# Patient Record
Sex: MALE | Race: WHITE | NOT HISPANIC OR LATINO | Employment: OTHER | URBAN - METROPOLITAN AREA
[De-identification: names, ages, dates, MRNs, and addresses within clinical notes are randomized per-mention and may not be internally consistent; named-entity substitution may affect disease eponyms.]

---

## 2017-03-08 ENCOUNTER — GENERIC CONVERSION - ENCOUNTER (OUTPATIENT)
Dept: OTHER | Facility: OTHER | Age: 67
End: 2017-03-08

## 2017-05-09 ENCOUNTER — GENERIC CONVERSION - ENCOUNTER (OUTPATIENT)
Dept: OTHER | Facility: OTHER | Age: 67
End: 2017-05-09

## 2017-05-11 ENCOUNTER — GENERIC CONVERSION - ENCOUNTER (OUTPATIENT)
Dept: OTHER | Facility: OTHER | Age: 67
End: 2017-05-11

## 2017-05-23 ENCOUNTER — GENERIC CONVERSION - ENCOUNTER (OUTPATIENT)
Dept: OTHER | Facility: OTHER | Age: 67
End: 2017-05-23

## 2017-05-26 ENCOUNTER — ALLSCRIPTS OFFICE VISIT (OUTPATIENT)
Dept: OTHER | Facility: OTHER | Age: 67
End: 2017-05-26

## 2017-05-26 ENCOUNTER — HOSPITAL ENCOUNTER (OUTPATIENT)
Dept: RADIOLOGY | Age: 67
Discharge: HOME | End: 2017-05-26
Payer: COMMERCIAL

## 2017-05-26 ENCOUNTER — TRANSCRIBE ORDERS (OUTPATIENT)
Dept: ADMINISTRATIVE | Age: 67
End: 2017-05-26

## 2017-05-26 DIAGNOSIS — I10 ESSENTIAL (PRIMARY) HYPERTENSION: ICD-10-CM

## 2017-05-26 DIAGNOSIS — R53.83 OTHER FATIGUE: ICD-10-CM

## 2017-05-26 DIAGNOSIS — M94.0 CHONDROCOSTAL JUNCTION SYNDROME: ICD-10-CM

## 2017-05-26 DIAGNOSIS — R73.03 PREDIABETES: ICD-10-CM

## 2017-05-26 DIAGNOSIS — Z12.5 ENCOUNTER FOR SCREENING FOR MALIGNANT NEOPLASM OF PROSTATE: ICD-10-CM

## 2017-05-26 PROCEDURE — 71120 X-RAY EXAM BREASTBONE 2/>VWS: CPT

## 2017-05-26 PROCEDURE — 71111 X-RAY EXAM RIBS/CHEST4/> VWS: CPT

## 2017-05-29 ENCOUNTER — GENERIC CONVERSION - ENCOUNTER (OUTPATIENT)
Dept: OTHER | Facility: OTHER | Age: 67
End: 2017-05-29

## 2017-05-30 ENCOUNTER — GENERIC CONVERSION - ENCOUNTER (OUTPATIENT)
Dept: OTHER | Facility: OTHER | Age: 67
End: 2017-05-30

## 2017-05-30 ENCOUNTER — LAB CONVERSION - ENCOUNTER (OUTPATIENT)
Dept: OTHER | Facility: OTHER | Age: 67
End: 2017-05-30

## 2017-05-30 LAB
25(OH)D3 SERPL-MCNC: 28 NG/ML (ref 30–100)
DEPRECATED RDW RBC AUTO: 14.1 % (ref 11–15)
HBA1C MFR BLD HPLC: 5.8 % OF TOTAL HGB
HCT VFR BLD AUTO: 49.1 % (ref 38.5–50)
HGB BLD-MCNC: 16.1 G/DL (ref 13.2–17.1)
MCH RBC QN AUTO: 29.5 PG (ref 27–33)
MCHC RBC AUTO-ENTMCNC: 32.9 G/DL (ref 32–36)
MCV RBC AUTO: 89.9 FL (ref 80–100)
PLATELET # BLD AUTO: 284 THOUSAND/UL (ref 140–400)
PMV BLD AUTO: 8.1 FL (ref 7.5–12.5)
PROSTATE SPECIFIC ANTIGEN TOTAL (HISTORICAL): 1.9 NG/ML
RBC # BLD AUTO: 5.46 MILLION/UL (ref 4.2–5.8)
VIT B12 SERPL-MCNC: 504 PG/ML (ref 200–1100)
WBC # BLD AUTO: 12 THOUSAND/UL (ref 3.8–10.8)

## 2017-05-31 ENCOUNTER — GENERIC CONVERSION - ENCOUNTER (OUTPATIENT)
Dept: OTHER | Facility: OTHER | Age: 67
End: 2017-05-31

## 2017-06-02 ENCOUNTER — GENERIC CONVERSION - ENCOUNTER (OUTPATIENT)
Dept: OTHER | Facility: OTHER | Age: 67
End: 2017-06-02

## 2017-06-02 LAB
BASOPHILS # BLD AUTO: 0.6 %
BASOPHILS # BLD AUTO: 70 CELLS/UL (ref 0–200)
DEPRECATED RDW RBC AUTO: 14 % (ref 11–15)
EOSINOPHIL # BLD AUTO: 3 %
EOSINOPHIL # BLD AUTO: 348 CELLS/UL (ref 15–500)
HCT VFR BLD AUTO: 46.2 % (ref 38.5–50)
HGB BLD-MCNC: 15.8 G/DL (ref 13.2–17.1)
LYMPHOCYTES # BLD AUTO: 25.5 %
LYMPHOCYTES # BLD AUTO: 2958 CELLS/UL (ref 850–3900)
MCH RBC QN AUTO: 30.9 PG (ref 27–33)
MCHC RBC AUTO-ENTMCNC: 34.3 G/DL (ref 32–36)
MCV RBC AUTO: 90.3 FL (ref 80–100)
MONOCYTES # BLD AUTO: 882 CELLS/UL (ref 200–950)
MONOCYTES (HISTORICAL): 7.6 %
NEUTROPHILS # BLD AUTO: 63.3 %
NEUTROPHILS # BLD AUTO: 7343 CELLS/UL (ref 1500–7800)
PLATELET # BLD AUTO: 275 THOUSAND/UL (ref 140–400)
PMV BLD AUTO: 8.6 FL (ref 7.5–12.5)
RBC # BLD AUTO: 5.12 MILLION/UL (ref 4.2–5.8)
WBC # BLD AUTO: 11.6 THOUSAND/UL (ref 3.8–10.8)

## 2017-08-30 ENCOUNTER — GENERIC CONVERSION - ENCOUNTER (OUTPATIENT)
Dept: OTHER | Facility: OTHER | Age: 67
End: 2017-08-30

## 2017-10-05 ENCOUNTER — GENERIC CONVERSION - ENCOUNTER (OUTPATIENT)
Dept: OTHER | Facility: OTHER | Age: 67
End: 2017-10-05

## 2017-11-30 ENCOUNTER — GENERIC CONVERSION - ENCOUNTER (OUTPATIENT)
Dept: OTHER | Facility: OTHER | Age: 67
End: 2017-11-30

## 2017-12-08 ENCOUNTER — GENERIC CONVERSION - ENCOUNTER (OUTPATIENT)
Dept: INTERNAL MEDICINE CLINIC | Facility: CLINIC | Age: 67
End: 2017-12-08

## 2018-01-10 NOTE — RESULT NOTES
Message   Notify the patient x-ray of the sternum unremarkable it does show diffuse arthritic changes throughout the thoracic spine please have the patient see pain management Dr Debra Cobian follow up as scheduled        Verified Results  * XR RIBS BILATERAL 4+ VIEW W PA CHEST [id number] 11:02AM [facility] Order Number: [id number]     Test Name Result Flag Reference   XR RIBS BILATERAL 4+ VW W PA CHEST (Report)     BILATERAL RIBS AND CHEST - DUAL ENERGY     INDICATION: Chest pain  COMPARISON: None     VIEWS: PA chest (including soft tissue/bone algorithms) and 4 coned down views of the ribs     IMAGES: 10     FINDINGS:     The cardiomediastinal silhouette is unremarkable  The lungs are clear  No pleural effusions  There is no pneumothorax  No rib fractures are identified  IMPRESSION:     1  No active pulmonary disease  2  No evidence of rib fractures  Workstation performed: [id number]     Signed by:   Millie Thompson MD   5/28/17     XR STERNUM MINIMUM 2 VIEWS [id number] 11:02AM [facility] Order Number: [id number]     Test Name Result Flag Reference   XR STERNUM MINIMUM 2 VIEWS (Report)     STERNUM     INDICATION: Sternal pain  COMPARISON: None     VIEWS: 2     IMAGES: 2      FINDINGS:     There is no acute fracture or pathologic bone lesion  The retrosternal clear space is maintained  Incidental note is made of diffuse degenerative changes throughout the thoracic spine  IMPRESSION:     Unremarkable examination of the sternum         Workstation performed: [id number]     Signed by:   Millie Thompson MD   5/28/17       Signatures   Electronically signed by : Roxy Martinez DO; May 29 2017 12:44PM EST                       (Author)

## 2018-01-12 NOTE — RESULT NOTES
Message   notify the patient low vitamin D level, please let me know how much vitamin D the patient is currently taking        Verified Results  *(Q) VITAMIN D, 25-HYDROXY, LC/MS/MS [id number] 10:23AM [facility]i Najera     Test Name Result Flag Reference   VITAMIN D, 25-OH, TOTAL 28 ng/mL L    Vitamin D Status         25-OH Vitamin D:     Deficiency:                    <20 ng/mL  Insufficiency:             20 - 29 ng/mL  Optimal:                 > or = 30 ng/mL     For 25-OH Vitamin D testing on patients on   D2-supplementation and patients for whom quantitation   of D2 and D3 fractions is required, the QuestAssureD(TM)  25-OH VIT D, (D2,D3), LC/MS/MS is recommended: order   code 05774 (patients >2yrs)  For more information on this test, go to:  [email or web address]  (This link is being provided for   informational/educational purposes only )       Signatures   Electronically signed by : Jhoana Saldivar DO; May 30 2017  7:36PM EST                       (Author)

## 2018-01-13 VITALS
WEIGHT: 315 LBS | SYSTOLIC BLOOD PRESSURE: 132 MMHG | DIASTOLIC BLOOD PRESSURE: 82 MMHG | HEIGHT: 74 IN | HEART RATE: 78 BPM | BODY MASS INDEX: 40.43 KG/M2 | RESPIRATION RATE: 18 BRPM

## 2018-01-13 NOTE — RESULT NOTES
Message   Notify the patient x-ray of the ribs and chest x-ray no active pulmonary disease, no evidence of rib fracture follow up as scheduled        Verified Results  * XR RIBS BILATERAL 4+ VIEW W PA CHEST [id number] 11:02AM [facility] Order Number: [id number]     Test Name Result Flag Reference   XR RIBS BILATERAL 4+ VW W PA CHEST (Report)     BILATERAL RIBS AND CHEST - DUAL ENERGY     INDICATION: Chest pain  COMPARISON: None     VIEWS: PA chest (including soft tissue/bone algorithms) and 4 coned down views of the ribs     IMAGES: 10     FINDINGS:     The cardiomediastinal silhouette is unremarkable  The lungs are clear  No pleural effusions  There is no pneumothorax  No rib fractures are identified  IMPRESSION:     1  No active pulmonary disease  2  No evidence of rib fractures         Workstation performed: [id number]     Signed by:   Rosalva Rondon MD   5/28/17       Signatures   Electronically signed by : Loraine Stuart DO; May 29 2017 12:43PM EST                       (Author)

## 2018-01-15 ENCOUNTER — ALLSCRIPTS OFFICE VISIT (OUTPATIENT)
Dept: OTHER | Facility: OTHER | Age: 68
End: 2018-01-15

## 2018-01-16 NOTE — PROGRESS NOTES
Assessment   1  Preop examination (V72 84) (Z01 818)1   2  Former smoker (V15 82) (A16 043)  3  Chronic lower back pain (724 2,338 29) (M54 5,G89 29)1      1 Amended By: Hunter Miguel; Jan 16 2018 8:33 AM EST      Plan   Advance directive discussed with patient    · We recommend that you create an advance directive ; Status:Complete;   Done:    [id number]    Discussion/Summary   Surgical Clearance: He is at a LOW risk from a cardiovascular standpoint at this time without any additional cardiac testing  Reevaluation needed, if he should present with symptoms prior to surgery/procedure  Surgical clearance faxed to Dr Bhavya Fermin   Cleared for surgery cardiac clearance  Chief Complaint   Medical clearance      History of Present Illness   Pre-Op Visit (Brief): The patient is being seen for a preoperative visit-- and-- back surgery with dr Bhavya Fermin  Surgical Risk Assessment:      Prior Anesthesia: He had no prior anesthesia  Pertinent Past Medical History: cad, stents, knee repalcement, htn  Exercise Capacity: able to walk four blocks without symptoms-- and-- able to walk two flights of stairs without symptoms  Lifestyle Factors: denies alcohol use  Symptoms: no symptoms  Pertinent Family History: no pertinent family history  Living Situation: home is secure and supportive and no post-op concerns with his living situation  HPI: preop clearance      Review of Systems        Constitutional: No fever or chills, feels well, no tiredness, no recent weight gain or weight loss  Eyes: No complaints of eye pain, no red eyes, no discharge from eyes, no itchy eyes  ENT: no complaints of earache, no hearing loss, no nosebleeds, no nasal discharge, no sore throat, no hoarseness  Cardiovascular: No complaints of slow heart rate, no fast heart rate, no chest pain, no palpitations, no leg claudication, no lower extremity        Respiratory: No complaints of shortness of breath, no wheezing, no cough, no SOB on exertion, no orthopnea or PND  Gastrointestinal: No complaints of abdominal pain, no constipation, no nausea or vomiting, no diarrhea or bloody stools  Genitourinary: No complaints of dysuria, no incontinence, no hesitancy, no nocturia, no genital lesion, no testicular pain  Musculoskeletal: No complaints of arthralgia, no myalgias, no joint swelling or stiffness, no limb pain or swelling  Neurological: No compliants of headache, no confusion, no convulsions, no numbness or tingling, no dizziness or fainting, no limb weakness, no difficulty walking  Psychiatric: Is not suicidal, no sleep disturbances, no anxiety or depression, no change in personality, no emotional problems  Endocrine: No complaints of proptosis, no hot flashes, no muscle weakness, no erectile dysfunction, no deepening of the voice, no feelings of weakness  Hematologic/Lymphatic: No complaints of swollen glands, no swollen glands in the neck, does not bleed easily, no easy bruising  Active Problems   1  Advance directive discussed with patient (V65 49) (Z71 89)  2  Benign essential hypertension (401 1) (I10)  3  CAD S/P percutaneous coronary angioplasty (414 01,V45 82) (I25 10,Z98 61)  4  Chronic lower back pain (724 2,338 29) (M54 5,G89 29)  5  Constipation (564 00) (K59 00)  6  Costochondritis (733 6) (M94 0)  7  Diarrhea (787 91) (R19 7)  8  Encounter for prostate cancer screening (V76 44) (Z12 5)  9  Fatigue (780 79) (R53 83)  10  GERD (gastroesophageal reflux disease) (530 81) (K21 9)  11  Hyperlipemia (272 4) (E78 5)  12  Prediabetes (790 29) (R73 03)  13  Preop examination (V72 84) (Z01 818)  14  Primary osteoarthritis of right knee (715 16) (M17 11)  15  Screening for genitourinary condition (V81 6) (Z13 89)  16  Screening for neurological condition (V80 09) (Z13 89)  17   Snoring (786 09) (R06 83)    Past Medical History    · History of fatigue (V13 89) (E75 075)   · History of fever (V13 89) (P27 379)   · History of Tachycardia (785 0) (R00 0)     The active problems and past medical history were reviewed and updated today  Surgical History    · History of Cataract Surgery   · History of Total Knee Arthroplasty     The surgical history was reviewed and updated today  Family History   Mother    · Family history of   Father    · Family history of    · Family history of coronary artery disease (V17 3) (Z82 49)     The family history was reviewed and updated today  Social History    · Denied: History of Alcohol Use (History)   · Denied: History of Drug Use   · Former smoker (T08 96) (N22 184)  The social history was reviewed and updated today  The social history was reviewed and is unchanged  Current Meds   1  Aspirin 81 MG TABS; TAKE 1 TABLET DAILY; Therapy: 60KDN9373 to Recorded  2  Atorvastatin Calcium 80 MG Oral Tablet; TAKE 1 TABLET DAILY; Therapy: 33TMZ0212 to (Evaluate:10Xou6338) Recorded  3  Dulcolax Stool Softener 100 MG Oral Capsule; TAKE 1 CAPSULE DAILY; Therapy: 79ZCB5783 to Recorded  4  Glucosamine Chondroitin Adv Oral Tablet; TAKE 1 TABLET DAILY AS DIRECTED; Therapy: 09HVD8238 to Recorded  5  Metoprolol Tartrate 25 MG Oral Tablet; TAKE 1 TABLET TWICE DAILY; Therapy: 44PML6179 to (Evaluate:78Ayr4596) Recorded  6  Naproxen 500 MG Oral Tablet; TAKE 1 TABLET EVERY 12 HOURS AS NEEDED; Therapy: 54QLU1604 to (Evaluate:35Tuk0100)  Requested for: 16PAB8443; Last     Rx:04Rci9807 Ordered  7  Omeprazole 40 MG Oral Capsule Delayed Release; TAKE 1 CAPSULE DAILY; Therapy: 56XJD3269 to (Evaluate:19Ecv5555)  Requested for: 03BKT1288 Recorded  8  Voltaren 1 % Transdermal Gel; APPLY SPARINGLY TO AFFECTED AREA(S) ONCE     DAILY; Therapy: 76JRQ3976 to (Evaluate:04Slj7953)  Requested for: 75DLL4787; Last     Rx:57Cdi1972 Ordered     The medication list was reviewed and updated today  Allergies   1  No Known Drug Allergies  2   No Known Environmental Allergies  3  No Known Food Allergies    Vitals    Recorded: 49FOQ6859 01:12PM   Temperature 97 8 F   Heart Rate 79   Respiration 18   Systolic 224   Diastolic 82   Height 6 ft 2 in   Weight 313 lb 0 8 oz   BMI Calculated 40 19   BSA Calculated 2 63   O2 Saturation 95     Physical Exam        Constitutional      General appearance: No acute distress, well appearing and well nourished  Eyes      Conjunctiva and lids: No swelling, erythema, or discharge  Pupils and irises: Equal, round and reactive to light  Ears, Nose, Mouth, and Throat      External inspection of ears and nose: Normal        Otoscopic examination: Tympanic membrance translucent with normal light reflex  Canals patent without erythema  Oropharynx: Normal with no erythema, edema, exudate or lesions  Pulmonary      Respiratory effort: No increased work of breathing or signs of respiratory distress  Auscultation of lungs: Clear to auscultation  Cardiovascular      Palpation of heart: Normal PMI, no thrills  Auscultation of heart: Normal rate and rhythm, normal S1 and S2, without murmurs  Examination of extremities for edema and/or varicosities: Normal        Lymphatic      Palpation of lymph nodes in neck: No lymphadenopathy  Musculoskeletal      1       Gait and station: Abnormal  1 -- Walks with cane1   Digits and nails: Normal without clubbing or cyanosis  Inspection/palpation of joints, bones, and muscles: Normal       Skin      Skin and subcutaneous tissue: Normal without rashes or lesions  Psychiatric      Orientation to person, place and time: Normal        Mood and affect: Normal         1 Amended By: [facility]; Jan 16 2018 8:33 AM EST      End of Encounter Meds   1  Naproxen 500 MG Oral Tablet; TAKE 1 TABLET EVERY 12 HOURS AS NEEDED; Therapy: 08BDS4314 to (Evaluate:22Row6866)  Requested for: 13HCG6282; Last     Rx:[id number] Ordered  2   Voltaren 1 % Transdermal Gel (Diclofenac Sodium); APPLY SPARINGLY TO AFFECTED     AREA(S) ONCE DAILY; Therapy: 67VBL8508 to (Evaluate:87Hea3990)  Requested for: 27CKZ6272; Last     Rx:30Nov2017 Ordered  3  Dulcolax Stool Softener 100 MG Oral Capsule; TAKE 1 CAPSULE DAILY; Therapy: 93ZQS2811 to Recorded  4  Omeprazole 40 MG Oral Capsule Delayed Release; TAKE 1 CAPSULE DAILY; Therapy: 57QMK4457 to (Evaluate:13Apr2016)  Requested for: 64EMN7240 Recorded  5  Aspirin 81 MG TABS; TAKE 1 TABLET DAILY; Therapy: 51XQH5896 to Recorded  6  Glucosamine Chondroitin Adv Oral Tablet; TAKE 1 TABLET DAILY AS DIRECTED; Therapy: 99FKR6720 to Recorded  7  Atorvastatin Calcium 80 MG Oral Tablet; TAKE 1 TABLET DAILY; Therapy: 87VDK7652 to (Evaluate:18Pgk3041) Recorded  8  Metoprolol Tartrate 25 MG Oral Tablet; TAKE 1 TABLET TWICE DAILY;      Therapy: 00HBC2035 to (Evaluate:45Lbs2447) Recorded    Signatures    Electronically signed by : Melissa Fry; Jan 22 2018  1:47PM EST                       (Author)     Electronically signed by : MALLY Lou Cha ; Jan 22 2018  2:35PM EST                       (Review)

## 2018-01-16 NOTE — RESULT NOTES
Message   notify the patient mild elevation of a hemoglobin A1c in the prediabetic range please have the patient reduce carbohydrates and sweets in her diet follow up as scheduled        Verified Results  *(Q) VITAMIN D, 25-HYDROXY, LC/MS/MS 11OHH1532 10:23AM [facility]     Test Name Result Flag Reference   VITAMIN D, 25-OH, TOTAL 28 ng/mL L    Vitamin D Status         25-OH Vitamin D:     Deficiency:                    <20 ng/mL  Insufficiency:             20 - 29 ng/mL  Optimal:                 > or = 30 ng/mL     For 25-OH Vitamin D testing on patients on   D2-supplementation and patients for whom quantitation   of D2 and D3 fractions is required, the QuestAssureD(TM)  25-OH VIT D, (D2,D3), LC/MS/MS is recommended: order   code 94706 (patients >2yrs)  For more information on this test, go to:  [email or web address]/faq/[email or web address]  (This link is being provided for   informational/educational purposes only )     (Q) CBC (H/H, RBC, INDICES, WBC, PLT) [id number] 10:23AM [facility]     Test Name Result Flag Reference   WHITE BLOOD CELL COUNT 12 0 Thousand/uL H 3 8-10 8   RED BLOOD CELL COUNT 5 46 Million/uL  4 20-5 80   HEMOGLOBIN 16 1 g/dL  13 2-17 1   HEMATOCRIT 49 1 %  38 5-50 0   MCV 89 9 fL  80 0-100 0   MCH 29 5 pg  27 0-33 0   MCHC 32 9 g/dL  32 0-36 0   RDW 14 1 %  11 0-15 0   PLATELET COUNT 763 Thousand/uL  140-400   MPV 8 1 fL  7 5-12 5     (1) VITAMIN B12 [id number] 10:23AM [facility] [employer]     Test Name Result Flag Reference   VITAMIN B12 504 pg/mL  200-1100     (1) PSA (SCREEN) (Dx V76 44 Screen for Prostate Cancer) [id number] 10:23AM [facility] [employer]     Test Name Result Flag Reference   PSA, TOTAL 1 9 ng/mL  < OR = 4 0   This test was performed using the Siemens  chemiluminescent method  Values obtained from  different assay methods cannot be used  interchangeably   PSA levels, regardless of  value, should not be interpreted as absolute  evidence of the presence or absence of disease  (Q) HEMOGLOBIN A1c [id number] 10:23AM Lina Leal   REPORT COMMENT:  FASTING:YES     Test Name Result Flag Reference   HEMOGLOBIN A1c 5 8 % of total Hgb H <5 7   For someone without known diabetes, a hemoglobin   A1c value between 5 7% and 6 4% is consistent with  prediabetes and should be confirmed with a   follow-up test      For someone with known diabetes, a value <7%  indicates that their diabetes is well controlled  A1c  targets should be individualized based on duration of  diabetes, age, comorbid conditions, and other  considerations  This assay result is consistent with an increased risk  of diabetes  Currently, no consensus exists regarding use of  hemoglobin A1c for diagnosis of diabetes for children         Signatures   Electronically signed by : Evelio Chua DO; May 30 2017  7:48PM EST                       (Author)

## 2018-01-16 NOTE — RESULT NOTES
Message   notify the patient normal vitamin B12 level follow up as scheduled        Verified Results  *(Q) VITAMIN D, 25-HYDROXY, LC/MS/MS [id number] 10:23AM Herlinda Raymon     Test Name Result Flag Reference   VITAMIN D, 25-OH, TOTAL 28 ng/mL L    Vitamin D Status         25-OH Vitamin D:     Deficiency:                    <20 ng/mL  Insufficiency:             20 - 29 ng/mL  Optimal:                 > or = 30 ng/mL     For 25-OH Vitamin D testing on patients on   D2-supplementation and patients for whom quantitation   of D2 and D3 fractions is required, the QuestAssureD(TM)  25-OH VIT D, (D2,D3), LC/MS/MS is recommended: order   code 83085 (patients >2yrs)  For more information on this test, go to:  [email or web address]  (This link is being provided for   informational/educational purposes only )     (Q) CBC (H/H, RBC, INDICES, WBC, PLT) [id number] 10:23AM Herlinda Raymon     Test Name Result Flag Reference   WHITE BLOOD CELL COUNT 12 0 Thousand/uL H 3 8-10 8   RED BLOOD CELL COUNT 5 46 Million/uL  4 20-5 80   HEMOGLOBIN 16 1 g/dL  13 2-17 1   HEMATOCRIT 49 1 %  38 5-50 0   MCV 89 9 fL  80 0-100 0   MCH 29 5 pg  27 0-33 0   MCHC 32 9 g/dL  32 0-36 0   RDW 14 1 %  11 0-15 0   PLATELET COUNT 586 Thousand/uL  140-400   MPV 8 1 fL  7 5-12 5     (1) VITAMIN B12 [id number] 10:23AM Herlinda Raymon     Test Name Result Flag Reference   VITAMIN B12 504 pg/mL  200-1100       Signatures   Electronically signed by : Gustabo Hollins DO; May 30 2017  7:38PM EST                       (Author)

## 2018-01-17 NOTE — MISCELLANEOUS
Provider Comments  Provider Comments:   Pt was a n/s  LMOM to call back and make new appt        Signatures   Electronically signed by : Mena Olguin DO; Aug 30 2017  9:36PM EST                       (Author)

## 2018-01-17 NOTE — RESULT NOTES
Message   notify the patient the PSAs at 1 9 please have the patient to urology Dr Kendrick Chester for a prostate examination follow up as scheduled        Verified Results  *(Q) VITAMIN D, 25-HYDROXY, LC/MS/MS [id number] 10:23AM Shelly Bur     Test Name Result Flag Reference   VITAMIN D, 25-OH, TOTAL 28 ng/mL L    Vitamin D Status         25-OH Vitamin D:     Deficiency:                    <20 ng/mL  Insufficiency:             20 - 29 ng/mL  Optimal:                 > or = 30 ng/mL     For 25-OH Vitamin D testing on patients on   D2-supplementation and patients for whom quantitation   of D2 and D3 fractions is required, the QuestAssureD(TM)  25-OH VIT D, (D2,D3), LC/MS/MS is recommended: order   code 80808 (patients >2yrs)  For more information on this test, go to:  http://[employer]/faq/MEW645  (This link is being provided for   informational/educational purposes only )     (Q) CBC (H/H, RBC, INDICES, WBC, PLT) [id number] 10:23AM Shelly Bur     Test Name Result Flag Reference   WHITE BLOOD CELL COUNT 12 0 Thousand/uL H 3 8-10 8   RED BLOOD CELL COUNT 5 46 Million/uL  4 20-5 80   HEMOGLOBIN 16 1 g/dL  13 2-17 1   HEMATOCRIT 49 1 %  38 5-50 0   MCV 89 9 fL  80 0-100 0   MCH 29 5 pg  27 0-33 0   MCHC 32 9 g/dL  32 0-36 0   RDW 14 1 %  11 0-15 0   PLATELET COUNT 658 Thousand/uL  140-400   MPV 8 1 fL  7 5-12 5     (1) VITAMIN B12 [id number] 10:23AM Shelly Bur     Test Name Result Flag Reference   VITAMIN B12 504 pg/mL  200-1100     (1) PSA (SCREEN) (Dx V76 44 Screen for Prostate Cancer) [id number] 10:23AM Shelly Bur     Test Name Result Flag Reference   PSA, TOTAL 1 9 ng/mL  < OR = 4 0   This test was performed using the Siemens  chemiluminescent method  Values obtained from  different assay methods cannot be used  interchangeably  PSA levels, regardless of  value, should not be interpreted as absolute  evidence of the presence or absence of disease  Signatures   Electronically signed by : Brad Strong DO; May 30 2017  7:39PM EST                       (Author)

## 2018-01-17 NOTE — RESULT NOTES
Message   notify the patient mild elevation of the CBC yohan please have the patient repeat a CBC with differential and follow up with me to discuss as scheduled        Verified Results  *(Q) VITAMIN D, 25-HYDROXY, LC/MS/MS [id number] 10:23AM Devi Najera     Test Name Result Flag Reference   VITAMIN D, 25-OH, TOTAL 28 ng/mL L    Vitamin D Status         25-OH Vitamin D:     Deficiency:                    <20 ng/mL  Insufficiency:             20 - 29 ng/mL  Optimal:                 > or = 30 ng/mL     For 25-OH Vitamin D testing on patients on   D2-supplementation and patients for whom quantitation   of D2 and D3 fractions is required, the QuestAssureD(TM)  25-OH VIT D, (D2,D3), LC/MS/MS is recommended: order   code 57033 (patients >2yrs)  For more information on this test, go to:  http://[employer]/faq/OOK763  (This link is being provided for   informational/educational purposes only )     (Q) CBC (H/H, RBC, INDICES, WBC, PLT) [id number] 10:23AM Devi Najera     Test Name Result Flag Reference   WHITE BLOOD CELL COUNT 12 0 Thousand/uL H 3 8-10 8   RED BLOOD CELL COUNT 5 46 Million/uL  4 20-5 80   HEMOGLOBIN 16 1 g/dL  13 2-17 1   HEMATOCRIT 49 1 %  38 5-50 0   MCV 89 9 fL  80 0-100 0   MCH 29 5 pg  27 0-33 0   MCHC 32 9 g/dL  32 0-36 0   RDW 14 1 %  11 0-15 0   PLATELET COUNT 115 Thousand/uL  140-400   MPV 8 1 fL  7 5-12 5       Signatures   Electronically signed by : Jhoana Saldivar DO; May 30 2017  7:37PM EST                       (Author)

## 2018-01-22 VITALS
HEIGHT: 74 IN | BODY MASS INDEX: 40.18 KG/M2 | HEART RATE: 79 BPM | WEIGHT: 313.05 LBS | RESPIRATION RATE: 18 BRPM | DIASTOLIC BLOOD PRESSURE: 82 MMHG | OXYGEN SATURATION: 95 % | TEMPERATURE: 97.8 F | SYSTOLIC BLOOD PRESSURE: 136 MMHG

## 2018-01-22 VITALS
BODY MASS INDEX: 40.43 KG/M2 | SYSTOLIC BLOOD PRESSURE: 130 MMHG | HEART RATE: 74 BPM | DIASTOLIC BLOOD PRESSURE: 80 MMHG | OXYGEN SATURATION: 94 % | RESPIRATION RATE: 20 BRPM | WEIGHT: 315 LBS | HEIGHT: 74 IN

## 2018-01-23 NOTE — CONSULTS
Chief Complaint  Medical clearance      History of Present Illness  Pre-Op Visit (Brief): The patient is being seen for a preoperative visit and back surgery with dr Perlita Pelaez  Surgical Risk Assessment:   Prior Anesthesia: He had no prior anesthesia  Pertinent Past Medical History: cad, stents, knee repalcement, htn  Exercise Capacity: able to walk four blocks without symptoms and able to walk two flights of stairs without symptoms  Lifestyle Factors: denies alcohol use  Symptoms: no symptoms  Pertinent Family History: no pertinent family history  Living Situation: home is secure and supportive and no post-op concerns with his living situation  HPI: preop clearance      Review of Systems    Constitutional: No fever or chills, feels well, no tiredness, no recent weight gain or weight loss  Eyes: No complaints of eye pain, no red eyes, no discharge from eyes, no itchy eyes  ENT: no complaints of earache, no hearing loss, no nosebleeds, no nasal discharge, no sore throat, no hoarseness  Cardiovascular: No complaints of slow heart rate, no fast heart rate, no chest pain, no palpitations, no leg claudication, no lower extremity  Respiratory: No complaints of shortness of breath, no wheezing, no cough, no SOB on exertion, no orthopnea or PND  Gastrointestinal: No complaints of abdominal pain, no constipation, no nausea or vomiting, no diarrhea or bloody stools  Genitourinary: No complaints of dysuria, no incontinence, no hesitancy, no nocturia, no genital lesion, no testicular pain  Musculoskeletal: No complaints of arthralgia, no myalgias, no joint swelling or stiffness, no limb pain or swelling  Neurological: No compliants of headache, no confusion, no convulsions, no numbness or tingling, no dizziness or fainting, no limb weakness, no difficulty walking  Psychiatric: Is not suicidal, no sleep disturbances, no anxiety or depression, no change in personality, no emotional problems     Endocrine: No complaints of proptosis, no hot flashes, no muscle weakness, no erectile dysfunction, no deepening of the voice, no feelings of weakness  Hematologic/Lymphatic: No complaints of swollen glands, no swollen glands in the neck, does not bleed easily, no easy bruising  Active Problems    1  Advance directive discussed with patient (V65 49) (Z71 89)   2  Benign essential hypertension (401 1) (I10)   3  CAD S/P percutaneous coronary angioplasty (414 01,V45 82) (I25 10,Z98 61)   4  Chronic lower back pain (724 2,338 29) (M54 5,G89 29)   5  Constipation (564 00) (K59 00)   6  Costochondritis (733 6) (M94 0)   7  Diarrhea (787 91) (R19 7)   8  Encounter for prostate cancer screening (V76 44) (Z12 5)   9  Fatigue (780 79) (R53 83)   10  GERD (gastroesophageal reflux disease) (530 81) (K21 9)   11  Hyperlipemia (272 4) (E78 5)   12  Prediabetes (790 29) (R73 03)   13  Preop examination (V72 84) (Z01 818)   14  Primary osteoarthritis of right knee (715 16) (M17 11)   15  Screening for genitourinary condition (V81 6) (Z13 89)   16  Screening for neurological condition (V80 09) (Z13 89)   17  Snoring (786 09) (R06 83)    Past Medical History    · History of fatigue (V13 89) (F81 048)   · History of fever (V13 89) (D03 004)   · History of Tachycardia (785 0) (R00 0)    The active problems and past medical history were reviewed and updated today  Surgical History    · History of Cataract Surgery   · History of Total Knee Arthroplasty    The surgical history was reviewed and updated today  Family History    · Family history of     · Family history of    · Family history of coronary artery disease (V17 3) (Z82 49)    The family history was reviewed and updated today  Social History    · Denied: History of Alcohol Use (History)   · Denied: History of Drug Use   · Former smoker (U68 40) (T13 327)  The social history was reviewed and updated today  The social history was reviewed and is unchanged  Current Meds   1  Aspirin 81 MG TABS; TAKE 1 TABLET DAILY; Therapy: 03ZQK3399 to Recorded   2  Atorvastatin Calcium 80 MG Oral Tablet; TAKE 1 TABLET DAILY; Therapy: 86CVB0828 to (Evaluate:15Usp8276) Recorded   3  Dulcolax Stool Softener 100 MG Oral Capsule; TAKE 1 CAPSULE DAILY; Therapy: 32NTC6300 to Recorded   4  Glucosamine Chondroitin Adv Oral Tablet; TAKE 1 TABLET DAILY AS DIRECTED; Therapy: 61WFT7657 to Recorded   5  Metoprolol Tartrate 25 MG Oral Tablet; TAKE 1 TABLET TWICE DAILY; Therapy: 62JZO0879 to (Evaluate:01Xaj5804) Recorded   6  Naproxen 500 MG Oral Tablet; TAKE 1 TABLET EVERY 12 HOURS AS NEEDED; Therapy: 53OHC9906 to (Evaluate:32Hxu6593)  Requested for: 51JNM1958; Last   Rx:30Nov2017 Ordered   7  Omeprazole 40 MG Oral Capsule Delayed Release; TAKE 1 CAPSULE DAILY; Therapy: 84NXC7663 to (Evaluate:13Apr2016)  Requested for: 76WML2457 Recorded   8  Voltaren 1 % Transdermal Gel; APPLY SPARINGLY TO AFFECTED AREA(S) ONCE DAILY; Therapy: 44GYE0663 to (Evaluate:69Ymp1922)  Requested for: 32WUI5919; Last   Rx:30Nov2017 Ordered    The medication list was reviewed and updated today  Allergies    1  No Known Drug Allergies    2  No Known Environmental Allergies   3  No Known Food Allergies    Vitals  Signs    Temperature: 97 8 F  Heart Rate: 79  Respiration: 18  Systolic: 927  Diastolic: 82  Height: 6 ft 2 in  Weight: 313 lb 0 8 oz  BMI Calculated: 40 19  BSA Calculated: 2 63  O2 Saturation: 95    Physical Exam    Constitutional   General appearance: No acute distress, well appearing and well nourished  Eyes   Conjunctiva and lids: No swelling, erythema, or discharge  Pupils and irises: Equal, round and reactive to light  Ears, Nose, Mouth, and Throat   External inspection of ears and nose: Normal     Otoscopic examination: Tympanic membrance translucent with normal light reflex  Canals patent without erythema  Oropharynx: Normal with no erythema, edema, exudate or lesions  Pulmonary   Respiratory effort: No increased work of breathing or signs of respiratory distress  Auscultation of lungs: Clear to auscultation  Cardiovascular   Palpation of heart: Normal PMI, no thrills  Auscultation of heart: Normal rate and rhythm, normal S1 and S2, without murmurs  Examination of extremities for edema and/or varicosities: Normal     Lymphatic   Palpation of lymph nodes in neck: No lymphadenopathy  Musculoskeletal   Gait and station: Abnormal   walks with cane  Digits and nails: Normal without clubbing or cyanosis  Inspection/palpation of joints, bones, and muscles: Normal     Skin   Skin and subcutaneous tissue: Normal without rashes or lesions  Psychiatric   Orientation to person, place and time: Normal     Mood and affect: Normal        Assessment    1  Preop examination (V72 84) (Z01 818)   2  Former smoker (V15 82) (C29 117)   3  Chronic lower back pain (724 2,338 29) (M54 5,G89 29)    Plan  Advance directive discussed with patient    · We recommend that you create an advance directive ; Status:Complete;   Done:  68JGR7951    Discussion/Summary  Surgical Clearance: He is at a LOW risk from a cardiovascular standpoint at this time without any additional cardiac testing  Reevaluation needed, if he should present with symptoms prior to surgery/procedure  Surgical clearance faxed to Dr Conner Garcia   Cleared for surgery  needs cardiac clearance  End of Encounter Meds    1  Naproxen 500 MG Oral Tablet; TAKE 1 TABLET EVERY 12 HOURS AS NEEDED; Therapy: 38TZZ6478 to (Evaluate:50Jvt7179)  Requested for: 42BDZ3131; Last   Rx:30Nov2017 Ordered   2  Voltaren 1 % Transdermal Gel (Diclofenac Sodium); APPLY SPARINGLY TO AFFECTED   AREA(S) ONCE DAILY; Therapy: 37MBH6958 to (Evaluate:77Evc5894)  Requested for: 44URS8947; Last   Rx:30Nov2017 Ordered    3  Dulcolax Stool Softener 100 MG Oral Capsule; TAKE 1 CAPSULE DAILY; Therapy: 73SOW2051 to Recorded    4   Omeprazole 40 MG Oral Capsule Delayed Release; TAKE 1 CAPSULE DAILY; Therapy: 41VOP6343 to (Evaluate:13Apr2016)  Requested for: 01QIW6007 Recorded    5  Aspirin 81 MG TABS; TAKE 1 TABLET DAILY; Therapy: 48UTT9617 to Recorded   6  Glucosamine Chondroitin Adv Oral Tablet; TAKE 1 TABLET DAILY AS DIRECTED; Therapy: 21QZM6207 to Recorded    7  Atorvastatin Calcium 80 MG Oral Tablet; TAKE 1 TABLET DAILY; Therapy: 89SME5170 to (Evaluate:92Dst0465) Recorded    8  Metoprolol Tartrate 25 MG Oral Tablet; TAKE 1 TABLET TWICE DAILY;    Therapy: 09JKE7532 to (Evaluate:41Npo2872) Recorded    Signatures   Electronically signed by : Lesly Davis; Jan 22 2018  1:47PM EST                       (Author)    Electronically signed by : MALLY Hebert ; Jan 22 2018  2:35PM EST                       (Review)

## 2021-08-23 NOTE — RESULT NOTES
Daily Note/Update    Today's date: 2021  Patient name: Wilton Figueroa  : 1936  MRN: [id number]  Referring provider: Jeremias Catherine MD  Dx:   Encounter Diagnosis     ICD-10-CM    1  Physical deconditioning  R53 81    2  Balance disorder  R26 89    3  Gait disturbance  R26 9        Start Time: 1350   Stop Time: 1430  Total time in clinic (min): 40 minutes    Subjective: My vertigo came back  Valeen Parrot declines any vestibular testing or intervention at the beginning of session  He agreed to vestibular assesment in the last 10 minutes of his session  Patient-Specific Functional Scale   Task is scored 0 (unable to perform activity) to 10 (ability to perform activity independently)  Activity     1  I want to be able to park in the garage 5 5   2  I want to be able to stand up straight and not lean forwards 5 6   3  Picking up object from the floor 5 5   4  Walk on my property (grass) without falling  4  5       Objective: See treatment diary below 155/76    Outcome Measures   Initial eval   21   6 Minute Walk Test (ft): 320' stopped walking at 3' 48"   94 SPO2 85HR   rpe 8 reports 3/10 250' with reports of fatigue  Post 3' 03"SPO2    2 Minute Walk Test (ft): 167'  180'   10MWT   67 m/s  57 m/s   5x Sit To Stand (s):   49 sec 46 seconds   TU sec without device  31 sec   Functional reach 5"  6"       MCTSIB Seconds   8/   Feet Together, Eyes Open  30 sec  held due to new onset of vertigo   Feet Together, Eyes Closed  24 sec  * "I start getting weak    Feet Together, Eyes Open Foam  0    Feet Together, Eyes Closed Foam  0        Positional Vertigo Tests:  Right Left   Neck range of motion Rotation  45  Lateral flexion 20  Flexion 35  Extension 20 45  20   Reyna-Hallpike positive tbt performed Epley on Right due to tie restraints   Roll Test n/t n/t   Nystagmus: Yes (left eye)  Type: apogeotropic  Onset: 4 seconds delay  Duration:5 seconds  Severity of symptoms: Message   Notify the patient persistently elevated white blood cell count please have the patient see a hematologist Dr Lilian Ashraf and follow-up as scheduled        Verified Results  (1) CBC/PLT/DIFF [id number] 09:42AM Lina Elvis   REPORT COMMENT:  FASTING:NO     Test Name Result Flag Reference   WHITE BLOOD CELL COUNT 11 6 Thousand/uL H 3 8-10 8   RED BLOOD CELL COUNT 5 12 Million/uL  4 20-5 80   HEMOGLOBIN 15 8 g/dL  13 2-17 1   HEMATOCRIT 46 2 %  38 5-50 0   MCV 90 3 fL  80 0-100 0   MCH 30 9 pg  27 0-33 0   MCHC 34 3 g/dL  32 0-36 0   RDW 14 0 %  11 0-15 0   PLATELET COUNT 359 Thousand/uL  140-400   ABSOLUTE NEUTROPHILS 7343 cells/uL  8586-2648   ABSOLUTE LYMPHOCYTES 2958 cells/uL  850-3900   ABSOLUTE MONOCYTES 882 cells/uL  200-950   ABSOLUTE EOSINOPHILS 348 cells/uL     ABSOLUTE BASOPHILS 70 cells/uL  0-200   NEUTROPHILS 63 3 %     LYMPHOCYTES 25 5 %     MONOCYTES 7 6 %     EOSINOPHILS 3 0 %     BASOPHILS 0 6 %     MPV 8 6 fL  7 5-12 5       Signatures   Electronically signed by : Evelio Chua DO; Jun 2 2017  4:26PM EST                       (Author) 6/10      Assessment:     Patient is an 80year old male who was referred to Out-Patient physical therapy with generalized deconditioning due to decreased activity since covid 19  pateint has been making steady progress including tolerating treadmill ambulation, as well as scifit endurance trainning and balance activities  However, today he presents with signs of vertigo, which he initially did not want to address, but after negotiations and convincing, allowed PT to briefly assess and treat  His prent finding directly impacted his FOM meausres  He was positive for Right BPPV and responded well to Epley maneuver  He will continue to benefit from skilled PT intervention to address vestibular system and continue to work towards strength and re-mobilization goals  STG's: 4 weeks    - Patient improves EC on foam task by 5 points on MCSTIB for improved static balance within 4 weeks ongoing    - Patient is independent with initial home exercise program for improvements at home within 2 weeks ongoing    - Patient ambulates for 6 consecutive min with appropriate vital sign response for improved endurance within 4 weeks  ongoing    - Patient will demonstrate improve muscle strength by improving 5 x sit to stand by 15 seconds ongoing    - Patient will complete FGA in 2 weeks ongoing    - Patient will improve in functional reach by 4 inches for improved balance   ongoing  LTG's: 12 weeks   - Patient improves distance ambulated on 6MWT by 30% within 8 weeks for improved endurance  Ongoing   - Patient decreased time to complete TUG by 4 sec for decreased risk of falls ongoing   - Patient improves gait speed to within 30% of age matched norms within 8 weeks  ongoing    - Patient improves score on FGA by 5 points for improved dynamic balance ongoing   - Patient will  Improve PSFS by 15 points for improved quality of life in 12 weeks progressing    Plan:  Patient will benefit from continued skilled outpatient physical therapy 2 x/wk for 8 wk  Therapeutic exercises, Neuromuscular re-education, vestibular intervention, therapeutic activities and manual therapy intervention       Precautions: chronic back pain, right eye prosthesis, chronic arterial ischemic stroke    HEP: STS, standing march, gastroc stretch     8/9 8/12 8/18 8/20 8/23   Endurance        Neuro Re-Ed        Balance NBOS foam 30sec x 3  Foam NBOS EC 30sec x 2  Solostep  Backwards 50' x 2  High Step 60' x 4  Sidestep  50' x 2 with large amplitude arms Solostep    High marching for weight shifting 40' x 2     Side stepping 40' x 2  Patient requires resting periods TUG           Step ups   Stepping up and down on the treadmill, requires verbal cues as for proper body mechanisc   5 x sit to stand   Hurdles  Low hurdles forward with minimal UE support in // bars x 4 laps, cues for increased clearance on R LE  Sideways with UE support on // bars x 2 laps -High step with minimal U support  - sidestepping 16 x 4 (simulating kitchen counter)    - Backward stepping 16 x 3     Solostep    Forwards step 25' x 4         Ther Ex        Scifit L3 for 10 minutes    SpO2 94-96%,  HR 75-80 L3 for 10 minutes    SpO2 94-96%,  HR 75-80 L3 for 10 minutes  SpO2 94-96%,  HR 75-80 L3 for 10 minutes SPo2 94% HR 78    STS 2 x 10 without UE's, patient with decreased pushing back on chair for 2nd set       Gastroc stretch 1 min x 2 each in standing                       Gait Training        Overground  200 feet x 1 without AD, cues for increased step length and heel strike Biodex ambulation 2 5 minutes   6mph   Rest x 3 minutes   2 5 min    9mph    Solostep     Large amplitude ambulation with 100% verbal cues  96 HR  Spo2 96%    SOLOSTEP  Biodex Ambulation   9mph hr 79   5 minutes  SPO2 95%  Rest   3 minutes SPO2 94% 6 MWT   Vestibular testing     Modified Reyna Hallpike  Epley with good result reported symptom reduction 1/10

## 2024-12-09 ENCOUNTER — NEW PATIENT (OUTPATIENT)
Dept: URBAN - METROPOLITAN AREA CLINIC 6 | Facility: CLINIC | Age: 74
End: 2024-12-09

## 2024-12-09 DIAGNOSIS — H40.1111: ICD-10-CM

## 2024-12-09 DIAGNOSIS — H40.1123: ICD-10-CM

## 2024-12-09 DIAGNOSIS — H25.811: ICD-10-CM

## 2024-12-09 DIAGNOSIS — Z96.1: ICD-10-CM

## 2024-12-09 DIAGNOSIS — H35.3132: ICD-10-CM

## 2024-12-09 PROCEDURE — 92133 CPTRZD OPH DX IMG PST SGM ON: CPT

## 2024-12-09 PROCEDURE — 76514 ECHO EXAM OF EYE THICKNESS: CPT

## 2024-12-09 PROCEDURE — 92004 COMPRE OPH EXAM NEW PT 1/>: CPT

## 2024-12-09 PROCEDURE — 92020 GONIOSCOPY: CPT

## 2024-12-09 PROCEDURE — 92202 OPSCPY EXTND ON/MAC DRAW: CPT

## 2024-12-09 ASSESSMENT — PACHYMETRY
OD_CT_UM: 555
OS_CT_UM: 590

## 2024-12-09 ASSESSMENT — VISUAL ACUITY
OD_PH: 20/60+1
OD_CC: 20/60
OS_CC: CF 3FT

## 2024-12-09 ASSESSMENT — TONOMETRY
OD_IOP_MMHG: 20
OS_IOP_MMHG: 20

## (undated) RX ORDER — BIMATOPROST 0.1 MG/ML: 1 SOLUTION/ DROPS OPHTHALMIC EVERY EVENING